# Patient Record
Sex: MALE | Race: WHITE | Employment: UNEMPLOYED | URBAN - METROPOLITAN AREA
[De-identification: names, ages, dates, MRNs, and addresses within clinical notes are randomized per-mention and may not be internally consistent; named-entity substitution may affect disease eponyms.]

---

## 2023-10-30 ENCOUNTER — HOSPITAL ENCOUNTER (OUTPATIENT)
Dept: CT IMAGING | Age: 58
Discharge: HOME | End: 2023-10-30
Payer: COMMERCIAL

## 2023-10-30 DIAGNOSIS — F17.210 CIGARETTE SMOKER: ICD-10-CM

## 2023-10-30 PROCEDURE — 71271 CT THORAX LUNG CANCER SCR C-: CPT

## 2024-10-10 ENCOUNTER — TELEPHONE (OUTPATIENT)
Dept: TELEMETRY | Age: 59
End: 2024-10-10

## 2024-12-04 ENCOUNTER — TELEPHONE (OUTPATIENT)
Dept: TELEMETRY | Age: 59
End: 2024-12-04

## 2025-01-03 ENCOUNTER — TELEPHONE (OUTPATIENT)
Dept: TELEMETRY | Age: 60
End: 2025-01-03

## 2025-01-03 NOTE — TELEPHONE ENCOUNTER
Patient due for annual CT Lung Screening. Reminder letter mailed.    Final notice.    Danielle Salinas RN

## 2025-05-11 ENCOUNTER — HOSPITAL ENCOUNTER (EMERGENCY)
Age: 60
Discharge: HOME | End: 2025-05-11
Attending: STUDENT IN AN ORGANIZED HEALTH CARE EDUCATION/TRAINING PROGRAM

## 2025-05-11 VITALS
DIASTOLIC BLOOD PRESSURE: 107 MMHG | BODY MASS INDEX: 23.7 KG/M2 | HEIGHT: 72 IN | WEIGHT: 175 LBS | TEMPERATURE: 98.2 F | SYSTOLIC BLOOD PRESSURE: 171 MMHG | RESPIRATION RATE: 16 BRPM | HEART RATE: 75 BPM

## 2025-05-11 DIAGNOSIS — S00.251A: Primary | ICD-10-CM

## 2025-05-11 DIAGNOSIS — H00.021 HORDEOLUM INTERNUM OF RIGHT UPPER EYELID: ICD-10-CM

## 2025-05-11 PROCEDURE — 65205 REMOVE FOREIGN BODY FROM EYE: CPT

## 2025-05-11 PROCEDURE — 99282 EMERGENCY DEPT VISIT SF MDM: CPT

## 2025-05-11 ASSESSMENT — VISUAL ACUITY
OD: 20/20
OS: 20/30
OU: 20/20

## 2025-05-11 ASSESSMENT — PAIN DESCRIPTION - ONSET
ONSET: ON-GOING
ONSET: ON-GOING

## 2025-05-11 ASSESSMENT — PAIN - FUNCTIONAL ASSESSMENT
PAIN_FUNCTIONAL_ASSESSMENT: 0-10
PAIN_FUNCTIONAL_ASSESSMENT: ACTIVITIES ARE NOT PREVENTED
PAIN_FUNCTIONAL_ASSESSMENT: 0-10
PAIN_FUNCTIONAL_ASSESSMENT: ACTIVITIES ARE NOT PREVENTED

## 2025-05-11 ASSESSMENT — PAIN DESCRIPTION - ORIENTATION
ORIENTATION: RIGHT
ORIENTATION: RIGHT

## 2025-05-11 ASSESSMENT — PAIN DESCRIPTION - LOCATION
LOCATION: EYE
LOCATION: EYE

## 2025-05-11 ASSESSMENT — PAIN DESCRIPTION - FREQUENCY
FREQUENCY: CONTINUOUS
FREQUENCY: CONTINUOUS

## 2025-05-11 ASSESSMENT — PAIN SCALES - GENERAL
PAINLEVEL_OUTOF10: 2
PAINLEVEL_OUTOF10: 1

## 2025-05-11 ASSESSMENT — PAIN DESCRIPTION - DESCRIPTORS
DESCRIPTORS: SORE
DESCRIPTORS: SORE

## 2025-05-11 ASSESSMENT — PAIN DESCRIPTION - PAIN TYPE
TYPE: ACUTE PAIN
TYPE: ACUTE PAIN

## 2025-05-11 NOTE — ED PROVIDER NOTES
[facility] EMERGENCY DEPARTMENT     EMERGENCY DEPARTMENT ENCOUNTER            Pt Name: Paramjit Henry   MRN: [id number]   Birthdate 1965   Date of evaluation: 5/11/2025   Provider: Orestes Grijalva MD   PCP: None, None   Note Started: 11:01 AM EDT 5/11/25          CHIEF COMPLAINT     Chief Complaint   Patient presents with    Eye Injury     Pt states he has a piece of plastic form work in his right eye since Friday              HISTORY OF PRESENT ILLNESS:   History from : Patient   Limitations to history : None     Paramjit Henry is a 59 y.o. male who presents with concern for foreign body in the eye.  He was cutting plastic at work and believes it went into his eyelid.  This was Friday.  He had no pain, foreign body sensation but then all of a sudden when he went to bed last night he could feel it again.  States that it is not painful but if there is a sensation that there is a foreign body but he believes it is in his eyelid and not over his eye.  It is not affecting his vision.  Visual acuity done by nursing staff actually shows better vision in the right eye than the left.  He just feels like there is something along his anterior eyelid.  He does believe it is a thin piece of plastic.  Has not been working with metal    Nursing Notes were all reviewed and agreed with, or any disagreements were addressed in the HPI.     REVIEW OF SYSTEMS :    Positives and Pertinent negatives as per HPI.      MEDICAL HISTORY   has a past medical history of Hypertension.    Past Surgical History:   Procedure Laterality Date    DENTAL SURGERY      WISDOM TOOTH EXTRACTION        CURRENTMEDICATIONS       Previous Medications    No medications on file      SCREENINGS          Carlotta Coma Scale  Eye Opening: Spontaneous  Best Verbal Response: Oriented  Best Motor Response: Obeys commands  Carlotta Coma Scale Score: 15                CIWA Assessment  BP: (!) 171/107  Pulse: 75              PHYSICAL:  Physical

## 2025-05-11 NOTE — DISCHARGE INSTRUCTIONS
Please follow-up with ophthalmology as needed.  I do not see a corneal abrasion.  No need for antibiotics.  You do have a stye.  Please use warm compresses as discussed in the discharge instructions.  If you feel like you have any need for reassessment I have provided an ophthalmologist office.